# Patient Record
Sex: MALE | Race: BLACK OR AFRICAN AMERICAN
[De-identification: names, ages, dates, MRNs, and addresses within clinical notes are randomized per-mention and may not be internally consistent; named-entity substitution may affect disease eponyms.]

---

## 2017-06-23 ENCOUNTER — HOSPITAL ENCOUNTER (EMERGENCY)
Dept: HOSPITAL 41 - JD.ED | Age: 37
Discharge: HOME | End: 2017-06-23
Payer: SELF-PAY

## 2017-06-23 VITALS — DIASTOLIC BLOOD PRESSURE: 108 MMHG | SYSTOLIC BLOOD PRESSURE: 144 MMHG
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
## 2017-06-23 DIAGNOSIS — Z88.0: ICD-10-CM

## 2017-06-23 DIAGNOSIS — M79.89: ICD-10-CM

## 2017-06-23 DIAGNOSIS — I10: ICD-10-CM

## 2017-06-23 DIAGNOSIS — T69.9XXA: Primary | ICD-10-CM

## 2017-06-23 NOTE — EDM.PDOC
ED HPI GENERAL MEDICAL PROBLEM





- General


Chief Complaint: General


Stated Complaint: HANDS ARE GOING TIGHT FOR NO REASON


Time Seen by Provider: 06/23/17 21:40


Source of Information: Reports: Patient, Family


History Limitations: Reports: No Limitations





- History of Present Illness


INITIAL COMMENTS - FREE TEXT/NARRATIVE: 





This is a 36-year-old male. He works in a refrigeration unit and he is noted 

over the last several months the reason the refrigeration unit and his hands 

get cold when he gets out into the warmth is hands will swell at times they 

feel puffy and tight. He also was noted this during the winter months as well. 

He denies any numbness or tingling in the fingers. But because of his feeling 

of tightness in his hands he comes to the ER for evaluation. States he still 

has full function of his hands they just feel tight. He denies any other acute 

symptoms at this time. He has no history of Raynauds.





- Related Data


 Allergies











Allergy/AdvReac Type Severity Reaction Status Date / Time


 


Penicillins Allergy Severe Rash Verified 06/23/17 21:42











Home Meds: 


 Home Meds





Topiramate 100 mg PO BEDTIME 07/01/15 [History]











Past Medical History


Cardiovascular History: Reports: Hypertension


Other Musculoskeletal History: tendon repair


Psychiatric History: Reports: Anxiety, Depression





Social & Family History





- Tobacco Use


Smoking Status *Q: Never Smoker


Second Hand Smoke Exposure: No





- Caffeine Use


Caffeine Use: Reports: Coffee





- Alcohol Use


Days Per Week of Alcohol Use: 2


Number of Drinks Per Day: 1


Total Drinks Per Week: 2





- Recreational Drug Use


Recreational Drug Use: Yes


Drug Use in Last 12 Months: Yes


Recreational Drug Type: Reports: Marijuana/Hashish


Recreational Drug Use Frequency: Binges


Recreational Drug Last Use: yesterday





- Living Situation & Occupation


Living situation: Reports: Single


Occupation: Employed





ED ROS GENERAL





- Review of Systems


Review Of Systems: See Below


Constitutional: Denies: Fever, Chills


HEENT: Reports: No Symptoms


Respiratory: Reports: No Symptoms


Cardiovascular: Reports: No Symptoms


Endocrine: Reports: No Symptoms


GI/Abdominal: Reports: No Symptoms


: Reports: No Symptoms


Musculoskeletal: Reports: Other (As per history of present illness)


Skin: Reports: Other (As per history of present illness)


Neurological: Reports: No Symptoms


Psychiatric: Reports: No Symptoms


Hematologic/Lymphatic: Reports: No Symptoms





ED EXAM, GENERAL





- Physical Exam


Exam: See Below


Exam Limited By: No Limitations


General Appearance: Alert, WD/WN, No Apparent Distress


Eye Exam: Bilateral Eye: Normal Inspection


Ears: Normal External Exam


Nose: Normal Inspection


Throat/Mouth: Normal Inspection


Head: Normocephalic


Neck: Supple


Respiratory/Chest: No Respiratory Distress


Back Exam: Full Range of Motion


Extremities: Normal Inspection, Normal Range of Motion, Other (Both hands 

appear to be supple they're not tight or swollen, full range of motion of both 

of his wrists, able to move his digits without difficulty and make a fist 

without difficulty and denies any pain, neurovascular is intact in all 10 digits

)


Neurological: Alert, Oriented


Psychiatric: Normal Affect, Normal Mood


Skin Exam: Warm, Dry





Course





- Vital Signs


Last Recorded V/S: 





 Last Vital Signs











Temp  98.7 F   06/23/17 21:37


 


Pulse  76   06/23/17 21:37


 


Resp  20   06/23/17 21:37


 


BP  144/108 H  06/23/17 21:37


 


Pulse Ox  100   06/23/17 21:37














Departure





- Departure


Time of Disposition: 23:11


Disposition: Home, Self-Care 01


Condition: Good


Clinical Impression: 


 Bilateral hand swelling





Cold exposure


Qualifiers:


 Encounter type: initial encounter Qualified Code(s): T69.9XXA - Effect of 

reduced temperature, unspecified, initial encounter








- Discharge Information


Forms:  ED Department Discharge


Additional Instructions: 


When going into the freezer be certain to wear gloves so your hands do not get 

cold and have the reaction of swelling when you get into the warm environment, 

recheck with your doctor regarding your blood pressure, return to the ER if 

needed

## 2017-08-21 ENCOUNTER — HOSPITAL ENCOUNTER (EMERGENCY)
Dept: HOSPITAL 41 - JD.ED | Age: 37
Discharge: HOME | End: 2017-08-21
Payer: SELF-PAY

## 2017-08-21 VITALS — SYSTOLIC BLOOD PRESSURE: 134 MMHG | DIASTOLIC BLOOD PRESSURE: 108 MMHG

## 2017-08-21 DIAGNOSIS — Z88.0: ICD-10-CM

## 2017-08-21 DIAGNOSIS — F41.9: ICD-10-CM

## 2017-08-21 DIAGNOSIS — F32.9: ICD-10-CM

## 2017-08-21 DIAGNOSIS — R25.2: Primary | ICD-10-CM

## 2017-08-21 DIAGNOSIS — I10: ICD-10-CM

## 2017-08-21 NOTE — EDM.PDOC
ED HPI GENERAL MEDICAL PROBLEM





- General


Chief Complaint: Neuro Symptoms/Deficits


Stated Complaint: HAND SPASM


Time Seen by Provider: 08/21/17 17:57


Source of Information: Reports: Patient


History Limitations: Reports: No Limitations





- History of Present Illness


INITIAL COMMENTS - FREE TEXT/NARRATIVE: 





36-year-old male presents to the ED due to severe cramping of his hands on 

intermittent basis. It occurred today it's occurred about a week ago and about 

6 months ago. Tenderness development of severe cramping of his fingers to the 

point that it's difficult to open up his hand to get the fingers to back into 

normal position. The cramps are extremely painful. It can involve any one of 

his fingers or thumbs. He doesn't seem to get cramps in his abdomen back 

muscles or calf musculature. Diet appears adequate. Appears to be well hydrated 

no recent illness that would cause him to lose potassium or other electrolytes. 

Only medication he takes is topiramate 100 mg at bedtime. Interestingly he was 

off this medication over the weekend started again last night. Symptoms started 

today. Topiramate can cause hypokalemia.


Onset: Today, Sudden, Other (Has had problems intermittently for the last 6 or 

8 months. No specific reason for the hands to be cramping up.)


Onset Date: 08/21/17


Duration: Minutes:, Resolved Prior to Arrival


Location: Reports: Upper Extremity, Left, Upper Extremity, Right


Quality: Reports: Ache, Other (Severe cramping in his fingers and thumbs to the 

point that they ball up or Hyperflex which is extremely painful.)


Severity: Severe (It is happened on about 6 occasions today.)


Improves with: Reports: None


Worsens with: Reports: None


Context: Reports: Activity.  Denies: Exercise, Lifting, Sick Contact, Trauma, 

Other


Associated Symptoms: Reports: No Other Symptoms, Other


Treatments PTA: Reports: Other (see below) (Does not get cramping any other 

tissues. None)





- Related Data


 Allergies











Allergy/AdvReac Type Severity Reaction Status Date / Time


 


Penicillins Allergy Severe Rash Verified 06/23/17 21:42











Home Meds: 


 Home Meds





Topiramate 100 mg PO BEDTIME 07/01/15 [History]











Past Medical History


Cardiovascular History: Reports: Hypertension


Other Musculoskeletal History: tendon repair


Psychiatric History: Reports: Anxiety, Depression





Social & Family History





- Tobacco Use


Smoking Status *Q: Never Smoker


Second Hand Smoke Exposure: No





- Caffeine Use


Caffeine Use: Reports: Coffee





- Alcohol Use


Days Per Week of Alcohol Use: 2


Number of Drinks Per Day: 1


Total Drinks Per Week: 2





- Recreational Drug Use


Recreational Drug Use: Yes


Drug Use in Last 12 Months: Yes


Recreational Drug Type: Reports: Marijuana/Hashish


Recreational Drug Use Frequency: Binges


Recreational Drug Last Use: yesterday





- Living Situation & Occupation


Living situation: Reports: Single


Occupation: Employed





ED ROS GENERAL





- Review of Systems


Review Of Systems: See Below


Constitutional: Denies: Fever, Chills, Malaise, Weakness, Fatigue, Night Sweats

, Diaphoresis, Decreased Appetite, Weight Loss


HEENT: Reports: No Symptoms


Respiratory: Reports: No Symptoms


Cardiovascular: Reports: No Symptoms


Endocrine: Reports: No Symptoms


GI/Abdominal: Reports: No Symptoms


: Reports: No Symptoms


Musculoskeletal: Reports: Other (As above severe muscle spasms in his hands and 

fingers. The spontaneously go into hyperflexion which is painful.)


Skin: Reports: No Symptoms


Neurological: Reports: No Symptoms


Psychiatric: Reports: No Symptoms


Hematologic/Lymphatic: Reports: No Symptoms





ED EXAM, NEURO





- Physical Exam


Exam: See Below


Exam Limited By: No Limitations


General Appearance: Alert, WD/WN, No Apparent Distress


Throat/Mouth: Normal Inspection, Normal Lips, Normal Teeth, Normal Oropharynx


Head Exam: Atraumatic, Normocephalic


Neck: Normal Inspection, Supple, Non-Tender, Full Range of Motion.  No: 

Lymphadenopathy (L), Lymphadenopathy (R)


Respiratory/Chest: No Respiratory Distress, Lungs Clear, Normal Breath Sounds, 

No Accessory Muscle Use


Cardiovascular: Normal Peripheral Pulses, Regular Rate, Rhythm, No Edema, No 

Murmur


GI/Abdominal: Normal Bowel Sounds, Soft, Non-Tender, No Organomegaly, No 

Distention


Neurological: Alert, Normal Mood/Affect, Normal Dorsiflexion, CN II-XII Intact, 

Normal Plantar Flexion, Normal Reflexes, No Motor/Sensory Deficits, Oriented x 3

, Other (At present he can open and close his hands with little to no 

difficulty. No spasticity appreciated at this time.)


Back Exam: Normal Inspection, Full Range of Motion


Extremities: Normal Inspection, Normal Range of Motion, Non-Tender, No Pedal 

Edema, Normal Capillary Refill


Psychiatric: Normal Affect, Normal Mood


Skin Exam: Warm, Dry, Intact, Normal Color, No Rash





Course





- Vital Signs


Last Recorded V/S: 


 Last Vital Signs











Temp  36.4 C   08/21/17 17:43


 


Pulse  88   08/21/17 17:43


 


Resp  19   08/21/17 17:43


 


BP  134/108 H  08/21/17 17:43


 


Pulse Ox  100   08/21/17 17:43














- Orders/Labs/Meds


Orders: 


 Active Orders 24 hr











 Category Date Time Status


 


 CALCIUM, IONIZED [REF] Stat Lab  08/21/17 18:10 Received











Labs: 


 Laboratory Tests











  08/21/17 08/21/17 Range/Units





  18:10 18:10 


 


WBC  4.42   (4.23-9.07)  K/mm3


 


RBC  5.43   (4.63-6.08)  M/mm3


 


Hgb  16.0   (13.7-17.5)  gm/L


 


Hct  45.6   (40.1-51.0)  %


 


MCV  84.0   (79.0-92.2)  fl


 


MCH  29.5   (25.7-32.2)  pg


 


MCHC  35.1   (32.2-35.5)  g/dl


 


RDW Std Deviation  43.0   (35.1-43.9)  fL


 


Plt Count  212   (163-337)  K/mm3


 


MPV  9.2 L   (9.4-12.3)  fl


 


Neutrophils % (Manual)  64 H   (40-60)  %


 


Band Neutrophils %  0   (0-10)  %


 


Lymphocytes % (Manual)  27   (20-40)  %


 


Atypical Lymphs %  1   %


 


Monocytes % (Manual)  6   (2-10)  %


 


Eosinophils % (Manual)  1   (0.8-7.0)  %


 


Basophils % (Manual)  1   (0.2-1.2)  


 


Toxic Granulation  1+ slight   


 


Platelet Estimate  Adequate   


 


Plt Morphology Comment  Normal   


 


RBC Morph Comment  Normal   


 


Sodium   137  (136-145)  mEq/L


 


Potassium   3.9  (3.5-5.1)  mEq/L


 


Chloride   102  ()  mEq/L


 


Carbon Dioxide   25  (21-32)  mEq/L


 


Anion Gap   13.9  (5-15)  


 


BUN   18  (7-18)  mg/dL


 


Creatinine   1.2  (0.7-1.3)  mg/dL


 


Est Cr Clr Drug Dosing   95.20  mL/min


 


Estimated GFR (MDRD)   > 60  (>60)  mL/min


 


BUN/Creatinine Ratio   15.0  (14-18)  


 


Glucose   93  ()  mg/dL


 


Calcium   8.9  (8.5-10.1)  mg/dL


 


Magnesium   1.7 L  (1.8-2.4)  mg/dl


 


Total Bilirubin   0.5  (0.2-1.0)  mg/dL


 


AST   21  (15-37)  U/L


 


ALT   34  (16-63)  U/L


 


Alkaline Phosphatase   89  ()  U/L


 


Total Protein   8.4 H  (6.4-8.2)  g/dl


 


Albumin   4.1  (3.4-5.0)  g/dl


 


Globulin   4.3  gm/dL


 


Albumin/Globulin Ratio   1.0  (1-2)  


 


TSH 3rd Generation   1.161  (0.358-3.74)  uIU/mL














- Radiology Interpretation


Free Text/Narrative:: 





36-year-old male presents the ED with severe cramping of his hands bilaterally. 

This is happened quite a few times a day and is happened a couple times in the 

past over the last 6 months. Never gets cramping anywhere else only in his 

hands. States the cramps are severely painful and he is to try and pull finger 

or thumb back into its normal position but sometimes it'll go right back into 

the cramping position. This usually is an indication of hyper or hyperkalemia 

or hypocalcemia. Plan will be to have routine labs done including an ionized 

calcium level.





- Re-Assessments/Exams


Free Text/Narrative Re-Assessment/Exam: 





08/21/17 19:07 .hematology is normal. Chemistry is completely normal other than 

a low serum magnesium at 1.7. Patient advised to purchase a multivitamin 

containing zinc and a separate magnesium oxide supplement such as 400 mg tablet 

once a day. He appears to maintain his hydration adequately and does drink 

Gatorade and Powerade etc. Whether or not his Topamax medication is 

contributing to the muscle cramping is unclear. This seems to be somewhat 

related as he was off it over the weekend took his first tablet last night and 

developed symptoms today. Only time will tell if similar type reactions occur 

after being off of it for a period of  time.














Departure





- Departure


Time of Disposition: 19:05


Disposition: Home, Self-Care 01


Condition: Fair


Clinical Impression: 


 Muscle cramps








- Discharge Information


Referrals: 


PCP,None [Primary Care Provider] - 


Forms:  ED Department Discharge


Additional Instructions: 


Evaluation the emergency room today in regards to severe cramping pains that 

are occurring in the hands intermittently today and have been problematic off 

and on in the past. This is most often due to low potassium or dehydration or 

can be from low or high calcium levels etc. Lab work today identified low serum 

magnesium levels and zinc levels. I suggest a multivitamin once a day with 

zinc. You may have to buy i.e. Magnesium supplement such as magnesium oxide 400 

mg tablet once a day. I would suggest [facility] is your best source as most of the 

time these medications contain what they say they do.





- My Orders


Last 24 Hours: 


My Active Orders





08/21/17 18:10


CALCIUM, IONIZED [REF] Stat 














- Assessment/Plan


Last 24 Hours: 


My Active Orders





08/21/17 18:10


CALCIUM, IONIZED [REF] Stat

## 2018-08-24 ENCOUNTER — HOSPITAL ENCOUNTER (EMERGENCY)
Dept: HOSPITAL 41 - JD.ED | Age: 38
Discharge: HOME | End: 2018-08-24
Payer: COMMERCIAL

## 2018-08-24 VITALS — SYSTOLIC BLOOD PRESSURE: 137 MMHG | DIASTOLIC BLOOD PRESSURE: 104 MMHG

## 2018-08-24 DIAGNOSIS — I10: ICD-10-CM

## 2018-08-24 DIAGNOSIS — Z88.0: ICD-10-CM

## 2018-08-24 DIAGNOSIS — M62.830: Primary | ICD-10-CM

## 2018-08-24 PROCEDURE — 96372 THER/PROPH/DIAG INJ SC/IM: CPT

## 2018-08-24 PROCEDURE — 99283 EMERGENCY DEPT VISIT LOW MDM: CPT

## 2018-08-24 NOTE — EDM.PDOC
ED HPI GENERAL MEDICAL PROBLEM





- General


Chief Complaint: Back Pain or Injury


Stated Complaint: BACK PAIN


Time Seen by Provider: 08/24/18 12:13


Source of Information: Reports: Patient


History Limitations: Reports: No Limitations





- History of Present Illness


INITIAL COMMENTS - FREE TEXT/NARRATIVE: 





37-year-old male presents for evaluation and treatment of back pain. Patient 

reports he was helping lift a washing machine this morning. Reports that he was 

lifting a majority of the weight. He reports feeling a pop in his back and 

having instant pain. States the pain is primarily on the left side of his back 

with shooting pain into his right leg. Worse with movement. He is now 

experiencing pain to his left leg as well. No numbness or tingling into the 

legs. No urinary incontinence or stool incontinence. This occurred around 0945 

this morning. No treatments prior to arrival in the ED.





He has no history of any back problems.


  ** Lower Back


Pain Score (Numeric/FACES): 7





- Related Data


 Allergies











Allergy/AdvReac Type Severity Reaction Status Date / Time


 


Penicillins Allergy Severe Rash Verified 08/24/18 10:53











Home Meds: 


 Home Meds





Topiramate 100 mg PO DAILY 07/01/15 [History]


Hydrocodone/Acetaminophen [Hydrocodon-Acetaminophen 5-325] 1 each PO Q4HR PRN #

15 tablet 08/24/18 [Rx]


Orphenadrine [Norflex] 100 mg PO BID PRN #20 tab.er 08/24/18 [Rx]











Past Medical History


Cardiovascular History: Reports: Hypertension


Musculoskeletal History: Reports: Other (See Below)


Other Musculoskeletal History: tendon repair, left hand


Psychiatric History: Reports: Anxiety, Depression





Social & Family History





- Tobacco Use


Smoking Status *Q: Never Smoker


Second Hand Smoke Exposure: No





- Caffeine Use


Caffeine Use: Reports: None





- Alcohol Use


Days Per Week of Alcohol Use: 7


Number of Drinks Per Day: 2


Total Drinks Per Week: 14





- Recreational Drug Use


Recreational Drug Use: Yes


Recreational Drug Type: Reports: Marijuana/Hashish


Recreational Drug Use Frequency: Weekly





- Living Situation & Occupation


Living situation: Reports: Single


Occupation: Employed





ED ROS GENERAL





- Review of Systems


Review Of Systems: See Below


GI/Abdominal: Denies: Stool Incontinence


: Denies: Incontinence


Musculoskeletal: Reports: Back Pain (low back right >> left).  Denies: Neck Pain


Neurological: Denies: Numbness, Tingling





ED EXAM,LOWER BACK PAIN/INJURY





- Physical Exam


Exam: See Below


Exam Limited By: No Limitations


General Appearance: Alert, WD/WN, Mild Distress


Respiratory/Chest: No Respiratory Distress, Lungs Clear, Normal Breath Sounds


Cardiovascular: Normal Peripheral Pulses, Regular Rate, Rhythm, No Murmur


Back Exam: Normal Inspection, Muscle Spasm (right lumbar), Paraspinal 

Tenderness (bilateral right >> left L2 to the sacrum), Vertebral Tenderness (L2 

to the sacrum), Other (ROM testing deferred due to pain)


Extremities: Normal Inspection


Neurological: Alert, Normal Mood/Affect, Normal Dorsiflexion, Normal Plantar 

Flexion, Straight Leg Raise (L), Straight Leg Raise (R)


Psychiatric: Normal Affect, Normal Mood


Skin Exam: Warm, Dry, Normal Color





Course





- Vital Signs


Last Recorded V/S: 


 Last Vital Signs











Temp  98.3 F   08/24/18 10:54


 


Pulse  82   08/24/18 10:54


 


Resp  18   08/24/18 10:54


 


BP  137/104 H  08/24/18 10:54


 


Pulse Ox  99   08/24/18 10:54














- Orders/Labs/Meds


Meds: 


Medications














Discontinued Medications














Generic Name Dose Route Start Last Admin





  Trade Name Freq  PRN Reason Stop Dose Admin


 


Diazepam  5 mg  08/24/18 12:05  08/24/18 12:32





  Valium.  PO  08/24/18 12:06  5 mg





  ONETIME ONE   Administration





     





     





     





     


 


Hydromorphone HCl  1 mg  08/24/18 12:05  08/24/18 12:31





  Dilaudid  IM  08/24/18 12:06  1 mg





  ONETIME ONE   Administration





     





     





     





     


 


Ketorolac Tromethamine  60 mg  08/24/18 12:05  08/24/18 12:32





  Toradol  IM  08/24/18 12:06  60 mg





  ONETIME ONE   Administration





     





     





     





     














- Re-Assessments/Exams


Free Text/Narrative Re-Assessment/Exam: 





08/24/18 13:24


Discussed with patient we could x-ray him, however, without any recent injury 

to the bones is likely will not show any additional findings. He likely has a 

bulging disc, possibly herniated, would need MRI for further evaluation. Here 

in the ED he agrees to pain management. I will then have a follow-up with his 

primary in about a week and if his pain present and may consider an MRI at that 

time and physical therapy.





Checked on the patient, he is feeling improved but continues to have some 

discomfort. He would like to go home, declined any further medications for pain 

in the ED. Recommend follow-up in a week with his primary care provider. Will 

give some muscle relaxers a few pain pills and encouraged to take an NSAID.





Discharge instructions as documented.








Departure





- Departure


Time of Disposition: 13:24


Disposition: Home, Self-Care 01


Condition: Fair


Clinical Impression: 


 Low back pain, Muscle spasm








- Discharge Information


*PRESCRIPTION DRUG MONITORING PROGRAM REVIEWED*: No


*COPY OF PRESCRIPTION DRUG MONITORING REPORT IN PATIENT [place]: No


Prescriptions: 


Hydrocodone/Acetaminophen [Hydrocodon-Acetaminophen 5-325] 1 each PO Q4HR PRN #

15 tablet


 PRN Reason: Pain


Orphenadrine [Norflex] 100 mg PO BID PRN #20 tab.er


 PRN Reason: Muscle Spasm


Instructions:  Muscle Cramps and Spasms, Easy-to-Read, Back Pain, Adult


Referrals: 


PCP,None [Primary Care Provider] - 


Aby Neal NP [Ordering Only Provider] - 


Forms:  ED Department Discharge


Additional Instructions: 


Recommend Aleve twice a day.  may take Norflex 1 tab twice a day as needed for 

muscle pain and spasms.





you were given medication in the ER that can affect your ability to drive and 

operate machinery. Do not drive or Operative machinery within 10 hours of 

taking prescription narcotic pain medication.





Pain not relieved by Aleve and Norflex, may take Norco 1 tab every 4-6 hours. 

Norco is habit-forming, take as little as needed to control your pain. Do not 

drive or operate machinery within 10 hours of taking Norco.





Also recommend using moist heat, or topical products such as Icyhot or BenGay 

for additional pain relief.





Follow up with your primary care provider in one week for recheck of your 

symptoms. If your symptoms persist may need to consider something like physical 

therapy or possibly an MRI.





Rest but do not be completely immobile. Activity as tolerated.





Please return to the ER if your symptoms change or worsen.